# Patient Record
Sex: MALE | Race: AMERICAN INDIAN OR ALASKA NATIVE | NOT HISPANIC OR LATINO | URBAN - METROPOLITAN AREA
[De-identification: names, ages, dates, MRNs, and addresses within clinical notes are randomized per-mention and may not be internally consistent; named-entity substitution may affect disease eponyms.]

---

## 2017-07-14 ENCOUNTER — EMERGENCY (EMERGENCY)
Facility: HOSPITAL | Age: 42
LOS: 0 days | Discharge: HOME | End: 2017-07-14

## 2017-07-14 DIAGNOSIS — M79.642 PAIN IN LEFT HAND: ICD-10-CM

## 2017-07-14 DIAGNOSIS — Z79.899 OTHER LONG TERM (CURRENT) DRUG THERAPY: ICD-10-CM

## 2017-10-02 ENCOUNTER — OUTPATIENT (OUTPATIENT)
Dept: OUTPATIENT SERVICES | Facility: HOSPITAL | Age: 42
LOS: 1 days | Discharge: HOME | End: 2017-10-02

## 2017-10-02 DIAGNOSIS — R05 COUGH: ICD-10-CM

## 2017-10-02 DIAGNOSIS — Z00.00 ENCOUNTER FOR GENERAL ADULT MEDICAL EXAMINATION WITHOUT ABNORMAL FINDINGS: ICD-10-CM

## 2019-04-10 ENCOUNTER — EMERGENCY (EMERGENCY)
Facility: HOSPITAL | Age: 44
LOS: 0 days | Discharge: HOME | End: 2019-04-10
Admitting: PHYSICIAN ASSISTANT
Payer: MEDICAID

## 2019-04-10 VITALS
SYSTOLIC BLOOD PRESSURE: 143 MMHG | TEMPERATURE: 97 F | OXYGEN SATURATION: 99 % | DIASTOLIC BLOOD PRESSURE: 83 MMHG | HEART RATE: 73 BPM | RESPIRATION RATE: 18 BRPM

## 2019-04-10 DIAGNOSIS — H53.8 OTHER VISUAL DISTURBANCES: ICD-10-CM

## 2019-04-10 DIAGNOSIS — H57.89 OTHER SPECIFIED DISORDERS OF EYE AND ADNEXA: ICD-10-CM

## 2019-04-10 PROCEDURE — 99282 EMERGENCY DEPT VISIT SF MDM: CPT

## 2019-04-10 NOTE — ED PROVIDER NOTE - PHYSICAL EXAMINATION
CONST: Well appearing in NAD  EYES: PERRL, EOMI, Sclera and conjunctiva clear. Vision 20/20 bilaterally, no abrasion noted  ENT: No nasal discharge. TM's clear B/L without drainage. Oropharynx normal appearing, no erythema or exudates. No abscess or swelling. Uvula midline. No temporal artery or mastoid tenderness.  NECK: Non-tender, no meningeal signs. normal ROM. supple   CARD: Normal S1 S2; Normal rate and rhythm  RESP: Equal BS B/L, No wheezes, rhonchi or rales. No distress  SKIN: Warm, dry, no acute rashes. Good turgor  NEURO: A&Ox3, No focal deficits. Strength 5/5 with no sensory deficits. Steady gait. F

## 2019-04-10 NOTE — ED PROVIDER NOTE - OBJECTIVE STATEMENT
44 year old male with no pmhx presents with left eye blurry vision x 3 days. Pt admits to bleeding from left eye last night after rubbing it. No pain, discharge, or swelling. wears glasses. no contacts

## 2019-04-10 NOTE — CONSULT NOTE ADULT - ASSESSMENT
1) Amaurosis Fugax  - ocular and retinal health intact with dilated fundus examination  - consider cardiac work up to include carotid doppler  - Pt advised to also f/u with PCP, RTC if symptoms recur or do not get better    2) Familial Drusen OU (chronic issue)  - fundus photos, autoflurescence and mac OCT taken today  - monitor in 6 months as an outpatient

## 2019-04-10 NOTE — ED PROVIDER NOTE - NS ED ROS FT
Review of Systems:  	•	CONSTITUTIONAL - no fever, no diaphoresis, no chills  	•	SKIN - no rash  	•	HEMATOLOGIC - no bleeding, no bruising  	•	EYES - + left eye blurry vision and bleeding   	•	ENT - no change in hearing, no sore throat, no ear pain or tinnitus  	•	RESPIRATORY - no shortness of breath, no cough  	•	CARDIAC - no chest pain, no palpitations  	•	GI - no abd pain, no nausea, no vomiting, no diarrhea, no constipation  	•	GENITO-URINARY - no discharge, no dysuria; no hematuria, no increased urinary frequency  	•	MUSCULOSKELETAL - no joint paint, no swelling, no redness  	•	NEUROLOGIC - no weakness, no headache, no paresthesias, no LOC

## 2019-04-10 NOTE — ED PROVIDER NOTE - NSFOLLOWUPCLINICS_GEN_ALL_ED_FT
[facility]  Ophthalmolgy  [address], [address]  [place] [address]  Phone: [phone]  Fax:   Follow Up Time:

## 2019-04-10 NOTE — CONSULT NOTE ADULT - SUBJECTIVE AND OBJECTIVE BOX
LEONIE PIERRE  MRN-[id number]  44y Male      Patient is a 44y old  Male who presents with a chief complaint of blurry vision    HPI: Pt c/o intermittent blurry vison OS. Pt reports having three episodes of blurry vision OS lasting 2-3 minutes over the last 3 days. Pt denies associated headache, numbness, weakness, or other symptoms. Pt reports after it happened yesterday he thought there was blood coming out of the eye.  Pt denies history of trauma, recent sick contacts, cold flu or other associated symptoms.   (-)burning, itching, redness, tearing OD/OS  (-)flashes, floaters, eye pain OD/OS    PAST MEDICAL & SURGICAL HISTORY:  High cholesterol     MEDICATIONS  (STANDING):  omeprazole, ASA 81 mg     Allergies  NKDA    Intolerances  None known    POHx:  HOLLY: 1 month at [facility]  Presbyopia   (-)injuries/surgeries    Ocular Medications: none    FOHx: Non-contributory    VISUAL ACUITY  OD:  sc 20/20  OS   sc 20/20    NEURO TESTING  Pupils: 	PERRL, (-) APD OD/OS  EOMS: 	FULL OD/OS  CVF: 	Full to red light OD/OS    ANTERIOR SEGMENT EVALUATION:   lids/lashes: 	clear OD/OS  conjunctiva: 	clear OD/OS  cornea: 	clear OD/OS  anterior chamber: deep & quiet OD/OS  iris: 	flat and even OD/OS    INTRAOCULAR PRESSURE  Goldmann Applanation Tonometry  OD: 20mmHg  OS: 20 mmHg  @10:39am     POSTERIOR SEGMENT EVALUATION  Dilated: Tropicamide 1%, Phenylephrine 2.5% OD/OS  Lens: 		1+ACC/PCC OD/OS  Vitreous: 	clear OD/OS  Optic nerve:	distinct, pink and healthy OD/OS  Macula: 	mottling, dense areas of small hard drusen OD/OS  Vessels: 	2:3 OD/OS  Periphery: 	flat, (-)holes/breaks/tears 360 OD/OS

## 2019-04-16 PROBLEM — Z00.00 ENCOUNTER FOR PREVENTIVE HEALTH EXAMINATION: Status: ACTIVE | Noted: 2019-04-16

## 2019-09-21 ENCOUNTER — EMERGENCY (EMERGENCY)
Facility: HOSPITAL | Age: 44
LOS: 0 days | Discharge: HOME | End: 2019-09-21
Attending: EMERGENCY MEDICINE | Admitting: EMERGENCY MEDICINE
Payer: MEDICAID

## 2019-09-21 VITALS
OXYGEN SATURATION: 99 % | RESPIRATION RATE: 20 BRPM | SYSTOLIC BLOOD PRESSURE: 137 MMHG | HEART RATE: 61 BPM | DIASTOLIC BLOOD PRESSURE: 86 MMHG

## 2019-09-21 VITALS
HEART RATE: 100 BPM | OXYGEN SATURATION: 98 % | DIASTOLIC BLOOD PRESSURE: 89 MMHG | SYSTOLIC BLOOD PRESSURE: 149 MMHG | RESPIRATION RATE: 18 BRPM | TEMPERATURE: 98 F

## 2019-09-21 DIAGNOSIS — I25.10 ATHEROSCLEROTIC HEART DISEASE OF NATIVE CORONARY ARTERY WITHOUT ANGINA PECTORIS: ICD-10-CM

## 2019-09-21 DIAGNOSIS — R07.89 OTHER CHEST PAIN: ICD-10-CM

## 2019-09-21 DIAGNOSIS — R07.9 CHEST PAIN, UNSPECIFIED: ICD-10-CM

## 2019-09-21 LAB
ALBUMIN SERPL ELPH-MCNC: 4.3 G/DL — SIGNIFICANT CHANGE UP (ref 3.5–5.2)
ALP SERPL-CCNC: 67 U/L — SIGNIFICANT CHANGE UP (ref 30–115)
ALT FLD-CCNC: 31 U/L — SIGNIFICANT CHANGE UP (ref 0–41)
ANION GAP SERPL CALC-SCNC: 12 MMOL/L — SIGNIFICANT CHANGE UP (ref 7–14)
AST SERPL-CCNC: 26 U/L — SIGNIFICANT CHANGE UP (ref 0–41)
BILIRUB SERPL-MCNC: 0.9 MG/DL — SIGNIFICANT CHANGE UP (ref 0.2–1.2)
BUN SERPL-MCNC: 13 MG/DL — SIGNIFICANT CHANGE UP (ref 10–20)
CALCIUM SERPL-MCNC: 9.5 MG/DL — SIGNIFICANT CHANGE UP (ref 8.5–10.1)
CHLORIDE SERPL-SCNC: 104 MMOL/L — SIGNIFICANT CHANGE UP (ref 98–110)
CO2 SERPL-SCNC: 23 MMOL/L — SIGNIFICANT CHANGE UP (ref 17–32)
CREAT SERPL-MCNC: 0.9 MG/DL — SIGNIFICANT CHANGE UP (ref 0.7–1.5)
GLUCOSE SERPL-MCNC: 113 MG/DL — HIGH (ref 70–99)
HCT VFR BLD CALC: 48.3 % — SIGNIFICANT CHANGE UP (ref 42–52)
HGB BLD-MCNC: 16.2 G/DL — SIGNIFICANT CHANGE UP (ref 14–18)
MCHC RBC-ENTMCNC: 29.1 PG — SIGNIFICANT CHANGE UP (ref 27–31)
MCHC RBC-ENTMCNC: 33.5 G/DL — SIGNIFICANT CHANGE UP (ref 32–37)
MCV RBC AUTO: 86.7 FL — SIGNIFICANT CHANGE UP (ref 80–94)
NRBC # BLD: 0 /100 WBCS — SIGNIFICANT CHANGE UP (ref 0–0)
PLATELET # BLD AUTO: 285 K/UL — SIGNIFICANT CHANGE UP (ref 130–400)
POTASSIUM SERPL-MCNC: 4.2 MMOL/L — SIGNIFICANT CHANGE UP (ref 3.5–5)
POTASSIUM SERPL-SCNC: 4.2 MMOL/L — SIGNIFICANT CHANGE UP (ref 3.5–5)
PROT SERPL-MCNC: 7.8 G/DL — SIGNIFICANT CHANGE UP (ref 6–8)
RBC # BLD: 5.57 M/UL — SIGNIFICANT CHANGE UP (ref 4.7–6.1)
RBC # FLD: 12.5 % — SIGNIFICANT CHANGE UP (ref 11.5–14.5)
SODIUM SERPL-SCNC: 139 MMOL/L — SIGNIFICANT CHANGE UP (ref 135–146)
TROPONIN T SERPL-MCNC: <0.01 NG/ML — SIGNIFICANT CHANGE UP
WBC # BLD: 10.16 K/UL — SIGNIFICANT CHANGE UP (ref 4.8–10.8)
WBC # FLD AUTO: 10.16 K/UL — SIGNIFICANT CHANGE UP (ref 4.8–10.8)

## 2019-09-21 PROCEDURE — 93010 ELECTROCARDIOGRAM REPORT: CPT

## 2019-09-21 PROCEDURE — 75574 CT ANGIO HRT W/3D IMAGE: CPT | Mod: 26

## 2019-09-21 PROCEDURE — 71046 X-RAY EXAM CHEST 2 VIEWS: CPT | Mod: 26

## 2019-09-21 PROCEDURE — 99285 EMERGENCY DEPT VISIT HI MDM: CPT

## 2019-09-21 RX ORDER — FAMOTIDINE 10 MG/ML
20 INJECTION INTRAVENOUS ONCE
Refills: 0 | Status: COMPLETED | OUTPATIENT
Start: 2019-09-21 | End: 2019-09-21

## 2019-09-21 RX ORDER — METOPROLOL TARTRATE 50 MG
100 TABLET ORAL ONCE
Refills: 0 | Status: COMPLETED | OUTPATIENT
Start: 2019-09-21 | End: 2019-09-21

## 2019-09-21 RX ORDER — DIPHENHYDRAMINE HYDROCHLORIDE AND LIDOCAINE HYDROCHLORIDE AND ALUMINUM HYDROXIDE AND MAGNESIUM HYDRO
30 KIT ONCE
Refills: 0 | Status: COMPLETED | OUTPATIENT
Start: 2019-09-21 | End: 2019-09-21

## 2019-09-21 RX ADMIN — DIPHENHYDRAMINE HYDROCHLORIDE AND LIDOCAINE HYDROCHLORIDE AND ALUMINUM HYDROXIDE AND MAGNESIUM HYDRO 30 MILLILITER(S): KIT at 10:26

## 2019-09-21 RX ADMIN — FAMOTIDINE 20 MILLIGRAM(S): 10 INJECTION INTRAVENOUS at 10:25

## 2019-09-21 RX ADMIN — Medication 100 MILLIGRAM(S): at 11:29

## 2019-09-21 NOTE — ED PROVIDER NOTE - CARE PROVIDER_API CALL
Chata Chong)  Internal Medicine  [address] [place] [address], [place] [address]  Phone: [phone]  Fax: [phone]  Follow Up Time:     Ketan Mirza)  Gastroenterology  [address] [place] [address]  Phone: [phone]  Fax: [phone]  Follow Up Time:

## 2019-09-21 NOTE — ED PROVIDER NOTE - PATIENT PORTAL LINK FT
You can access the FollowMyHealth Patient Portal offered by [facility] by registering at the following website: [email or web address]. By joining [employer]’s FollowMyHealth portal, you will also be able to view your health information using other applications (apps) compatible with our system.

## 2019-09-21 NOTE — ED PROVIDER NOTE - CLINICAL SUMMARY MEDICAL DECISION MAKING FREE TEXT BOX
Labs, CXR ok. CCTA with CAD-RAD score of 0 and calcium score of 0. Pt felt better with GI cocktail, will d/c with GI f/u.

## 2019-09-21 NOTE — ED ADULT NURSE NOTE - CHPI ED NUR SYMPTOMS NEG
no shortness of breath/no back pain/no diaphoresis/no syncope/no fever/no chills/no nausea/no congestion/no dizziness/no vomiting

## 2019-09-21 NOTE — ED PROVIDER NOTE - NS ED ROS FT
Review of Systems:  •	CONSTITUTIONAL - No fever, No diaphoresis, No weight change  •	SKIN - No rash  •	HEMATOLOGIC - No abnormal bleeding or bruising  •	EYES - No eye pain, No blurred vision  •	ENT - No change in hearing, No sore throat, No neck pain, No rhinorrhea, No ear pain  •	RESPIRATORY - No shortness of breath, No cough  •	CARDIAC - + chest pain, No palpitations  •	GI - No abdominal pain, No nausea, No vomiting, No diarrhea, No constipation, No bright red blood per rectum or melena. No flank pain  •             - No dysuria, frequency, hematuria.   •	ENDO - No polydypsia, No polyuria, No heat/cold intolerance  •	MUSCULOSKELETAL - No joint paint, No swelling, No back pain  •	NEUROLOGIC - No numbness, No focal weakness, No headache, No dizziness  All other systems negative, unless specified in HPI

## 2019-09-21 NOTE — ED ADULT NURSE NOTE - OBJECTIVE STATEMENT
43 y/o male brought in for chest pain. Patient PMH gerd, gastritis, herpes complaint of midsternal chest pain radiating to left chest are for 3 days. Patient states pain feels "burning" intermittent lasting 1-2 minutes. Patient states he has been belching more often than usual. Patient noticed pain this morning around 7am after eating breakfast. Patient also admits to headache and left calf pain. 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 Patient denies: blurry vision, confusion, nausea, vomiting, shortness of breath, diarrhea, abdominal pain.

## 2019-09-21 NOTE — ED PROVIDER NOTE - OBJECTIVE STATEMENT
45 y/o male with PMH of GERD who presents to ED for chest pain. Pt states that for the past 3 day he has had burning pain in the left side of chest lasting a few seconds, no diaphoreses, radiation. No n/v. No abdominal pain. Never smoker, no family hx of heart disease.

## 2019-09-21 NOTE — ED PROVIDER NOTE - CARE PROVIDERS DIRECT ADDRESSES
[email or web address]

## 2019-09-21 NOTE — ED PROVIDER NOTE - ATTENDING CONTRIBUTION TO CARE
43yo man h/o GERD c/o L upper chest pain over the past few days, intermittent, nonexertional, worse with lying down 43yo man h/o GERD c/o L upper chest pain over the past few days, intermittent, nonexertional, worse with lying down. No exertional component, nonsmoker with no FH of CAD. VS, exam as noted, pt nontoxic appearing but uncomfortable, lungs CTA, CVS1S2 RRR abd soft, NT, ND, no peripheral edema. EKG nonischemic, doubt PE, sx more likely GI but will check labs, consider CCTA, reassess.

## 2020-08-20 ENCOUNTER — EMERGENCY (EMERGENCY)
Facility: HOSPITAL | Age: 45
LOS: 0 days | Discharge: HOME | End: 2020-08-20
Attending: STUDENT IN AN ORGANIZED HEALTH CARE EDUCATION/TRAINING PROGRAM | Admitting: FAMILY MEDICINE
Payer: COMMERCIAL

## 2020-08-20 VITALS
OXYGEN SATURATION: 100 % | DIASTOLIC BLOOD PRESSURE: 83 MMHG | TEMPERATURE: 98 F | SYSTOLIC BLOOD PRESSURE: 141 MMHG | RESPIRATION RATE: 17 BRPM | HEART RATE: 79 BPM | WEIGHT: 212.97 LBS

## 2020-08-20 DIAGNOSIS — R07.89 OTHER CHEST PAIN: ICD-10-CM

## 2020-08-20 DIAGNOSIS — R07.9 CHEST PAIN, UNSPECIFIED: ICD-10-CM

## 2020-08-20 DIAGNOSIS — K21.9 GASTRO-ESOPHAGEAL REFLUX DISEASE WITHOUT ESOPHAGITIS: ICD-10-CM

## 2020-08-20 PROBLEM — K29.70 GASTRITIS, UNSPECIFIED, WITHOUT BLEEDING: Chronic | Status: ACTIVE | Noted: 2019-09-21

## 2020-08-20 PROBLEM — B00.9 HERPESVIRAL INFECTION, UNSPECIFIED: Chronic | Status: ACTIVE | Noted: 2019-09-21

## 2020-08-20 LAB
ALBUMIN SERPL ELPH-MCNC: 4.3 G/DL — SIGNIFICANT CHANGE UP (ref 3.5–5.2)
ALP SERPL-CCNC: 74 U/L — SIGNIFICANT CHANGE UP (ref 30–115)
ALT FLD-CCNC: 30 U/L — SIGNIFICANT CHANGE UP (ref 0–41)
ANION GAP SERPL CALC-SCNC: 9 MMOL/L — SIGNIFICANT CHANGE UP (ref 7–14)
AST SERPL-CCNC: 29 U/L — SIGNIFICANT CHANGE UP (ref 0–41)
BASOPHILS # BLD AUTO: 0.09 K/UL — SIGNIFICANT CHANGE UP (ref 0–0.2)
BASOPHILS NFR BLD AUTO: 0.9 % — SIGNIFICANT CHANGE UP (ref 0–1)
BILIRUB SERPL-MCNC: 0.9 MG/DL — SIGNIFICANT CHANGE UP (ref 0.2–1.2)
BUN SERPL-MCNC: 11 MG/DL — SIGNIFICANT CHANGE UP (ref 10–20)
CALCIUM SERPL-MCNC: 9.3 MG/DL — SIGNIFICANT CHANGE UP (ref 8.5–10.1)
CHLORIDE SERPL-SCNC: 104 MMOL/L — SIGNIFICANT CHANGE UP (ref 98–110)
CO2 SERPL-SCNC: 25 MMOL/L — SIGNIFICANT CHANGE UP (ref 17–32)
CREAT SERPL-MCNC: 1 MG/DL — SIGNIFICANT CHANGE UP (ref 0.7–1.5)
EOSINOPHIL # BLD AUTO: 0.54 K/UL — SIGNIFICANT CHANGE UP (ref 0–0.7)
EOSINOPHIL NFR BLD AUTO: 5.4 % — SIGNIFICANT CHANGE UP (ref 0–8)
GLUCOSE SERPL-MCNC: 108 MG/DL — HIGH (ref 70–99)
HCT VFR BLD CALC: 50 % — SIGNIFICANT CHANGE UP (ref 42–52)
HGB BLD-MCNC: 16.3 G/DL — SIGNIFICANT CHANGE UP (ref 14–18)
IMM GRANULOCYTES NFR BLD AUTO: 0.4 % — HIGH (ref 0.1–0.3)
LIDOCAIN IGE QN: 35 U/L — SIGNIFICANT CHANGE UP (ref 7–60)
LYMPHOCYTES # BLD AUTO: 3.5 K/UL — HIGH (ref 1.2–3.4)
LYMPHOCYTES # BLD AUTO: 34.9 % — SIGNIFICANT CHANGE UP (ref 20.5–51.1)
MAGNESIUM SERPL-MCNC: 2.2 MG/DL — SIGNIFICANT CHANGE UP (ref 1.8–2.4)
MCHC RBC-ENTMCNC: 28.8 PG — SIGNIFICANT CHANGE UP (ref 27–31)
MCHC RBC-ENTMCNC: 32.6 G/DL — SIGNIFICANT CHANGE UP (ref 32–37)
MCV RBC AUTO: 88.5 FL — SIGNIFICANT CHANGE UP (ref 80–94)
MONOCYTES # BLD AUTO: 1.11 K/UL — HIGH (ref 0.1–0.6)
MONOCYTES NFR BLD AUTO: 11.1 % — HIGH (ref 1.7–9.3)
NEUTROPHILS # BLD AUTO: 4.75 K/UL — SIGNIFICANT CHANGE UP (ref 1.4–6.5)
NEUTROPHILS NFR BLD AUTO: 47.3 % — SIGNIFICANT CHANGE UP (ref 42.2–75.2)
NRBC # BLD: 0 /100 WBCS — SIGNIFICANT CHANGE UP (ref 0–0)
NT-PROBNP SERPL-SCNC: 10 PG/ML — SIGNIFICANT CHANGE UP (ref 0–300)
PLATELET # BLD AUTO: 287 K/UL — SIGNIFICANT CHANGE UP (ref 130–400)
POTASSIUM SERPL-MCNC: 4.2 MMOL/L — SIGNIFICANT CHANGE UP (ref 3.5–5)
POTASSIUM SERPL-SCNC: 4.2 MMOL/L — SIGNIFICANT CHANGE UP (ref 3.5–5)
PROT SERPL-MCNC: 7.3 G/DL — SIGNIFICANT CHANGE UP (ref 6–8)
RBC # BLD: 5.65 M/UL — SIGNIFICANT CHANGE UP (ref 4.7–6.1)
RBC # FLD: 12.6 % — SIGNIFICANT CHANGE UP (ref 11.5–14.5)
SODIUM SERPL-SCNC: 138 MMOL/L — SIGNIFICANT CHANGE UP (ref 135–146)
TROPONIN T SERPL-MCNC: <0.01 NG/ML — SIGNIFICANT CHANGE UP
WBC # BLD: 10.03 K/UL — SIGNIFICANT CHANGE UP (ref 4.8–10.8)
WBC # FLD AUTO: 10.03 K/UL — SIGNIFICANT CHANGE UP (ref 4.8–10.8)

## 2020-08-20 PROCEDURE — 93010 ELECTROCARDIOGRAM REPORT: CPT

## 2020-08-20 PROCEDURE — 71046 X-RAY EXAM CHEST 2 VIEWS: CPT | Mod: 26

## 2020-08-20 PROCEDURE — 99285 EMERGENCY DEPT VISIT HI MDM: CPT

## 2020-08-20 RX ORDER — FAMOTIDINE 10 MG/ML
20 INJECTION INTRAVENOUS ONCE
Refills: 0 | Status: COMPLETED | OUTPATIENT
Start: 2020-08-20 | End: 2020-08-20

## 2020-08-20 RX ADMIN — FAMOTIDINE 20 MILLIGRAM(S): 10 INJECTION INTRAVENOUS at 10:09

## 2020-08-20 RX ADMIN — Medication 30 MILLILITER(S): at 10:09

## 2020-08-20 NOTE — ED PROVIDER NOTE - NS ED ATTENDING STATEMENT MOD
I have personally seen and examined this patient.  I have fully participated in the care of this patient. I have reviewed all pertinent clinical information, including history, physical exam, plan and the Resident’s note and agree except as noted. I have personally seen and examined this patient.  I have fully participated in the care of this patient. I have reviewed all pertinent clinical information, including history, physical exam, plan and the Medical/PA/NP Student and Resident’s note and agree except as noted.

## 2020-08-20 NOTE — ED PROVIDER NOTE - OBJECTIVE STATEMENT
45 year old male with PMH GERD complains of a 3 week history of left sided chest pain. He describes the pain as intermittent burning or sharp pains accompanied by radiation into the left upper extremity. He says the pain gets worse when he lays down at night and Tylenol has helped relieve the pain. He had a similar complaint 1 year ago, had a CTA with CAD-RADS 0, and was told to follow up with GI and Cardio. He denies shortness of breath, dizziness, palpitations, cough, recent illness, nausea, vomiting, abdominal pain, diarrhea, constipation, swelling, rashes, or urinary symptoms. 45 year old male with PMH GERD complains of a 3 week history of left sided chest pain. Last episode of chest pain was this morning. Patient states he currently does not have chest pain in the ED. He describes the pain as intermittent burning or sharp pains accompanied by radiation into the left upper extremity. He says the pain gets worse when he lays down at night and Tylenol has helped relieve the pain. He had a similar complaint 1 year ago, had a CTA with CAD-RADS 0, and was told to follow up with GI and Cardio. He denies shortness of breath, dizziness, palpitations, cough, recent illness, nausea, vomiting, abdominal pain, diarrhea, constipation, swelling, rashes, or urinary symptoms.

## 2020-08-20 NOTE — ED PROVIDER NOTE - PROVIDER TOKENS
PROVIDER:[TOKEN:[[id number]],FOLLOWUP:[Urgent],ESTABLISHEDPATIENT:[T]],PROVIDER:[TOKEN:[[id number]],FOLLOWUP:[1-3 Days]],PROVIDER:[TOKEN:[[id number]],FOLLOWUP:[1-3 Days]]

## 2020-08-20 NOTE — ED PROVIDER NOTE - CLINICAL SUMMARY MEDICAL DECISION MAKING FREE TEXT BOX
pt presents with chest pain, asymptomatic at presentation. ekg, cxr, and labs were unremarkable. pt continued to be asymptomatic during evaluation. will dc pt with pcp follow up and return precautions'.

## 2020-08-20 NOTE — ED PROVIDER NOTE - PATIENT PORTAL LINK FT
You can access the FollowMyHealth Patient Portal offered by [facility] by registering at the following website: [email or web address]. By joining [employer]’s FollowMyHealth portal, you will also be able to view your health information using other applications (apps) compatible with our system.

## 2020-08-20 NOTE — ED PROVIDER NOTE - ATTENDING CONTRIBUTION TO CARE
45 yr old m that presents with chest pain. Pt reports that the chest pain has been going on for ~ 3 weeks on and off and is worse when pressing on the chest. Pt on exam, is currently asymptomatic. Pt at the point of exam denies any symptoms.     Review of Systems    Constitutional: (-) fever  Cardiovascular: (-) chest pain, (-) syncope  Respiratory: (-) cough, (-) shortness of breath  Gastrointestinal: (-) vomiting, (-) diarrhea, (-) abdominal pain  Musculoskeletal: (-) neck pain, (-) back pain, (-) joint pain  Integumentary: (-) rash, (-) edema  Neurological: (-) headache, (-) altered mental status    Except as documented in the HPI, all other systems are negative.    VITAL SIGNS: I have reviewed nursing notes and confirm.  CONSTITUTIONAL: non-toxic, well appearing  SKIN: no rash, no petechiae.  EYES: PERRL, EOMI, pink conjunctiva, anicteric  ENT: tongue midline, no exudates, MMM  NECK: Supple; no meningismus, no JVD  CARD: RRR, no murmurs, equal radial pulses bilaterally 2+. chest pain is reproducible on palpation. no rashes   RESP: CTAB, no respiratory distress  ABD: Soft, non-tender, non-distended, no peritoneal signs, no HSM, no CVA tenderness  EXT: Normal ROM x4. No edema. No calves tenderness  NEURO: Alert, oriented. CN2-12 intact, equal strength bilaterally, nl gait.  PSYCH: Cooperative, appropriate.    a/p  45 yr old m who presents with chest pain, currently asymptomatic and chest pain is reproducible on palpation during exam. HEART score of 2   -ekg unremarkable  -labs  -trop x 1  -cxr  -consider discharge with cardiology follow up

## 2020-08-20 NOTE — ED ADULT TRIAGE NOTE - CHIEF COMPLAINT QUOTE
patient complaint of chest pain x 3 days hx of covid in april patient complaint of chest pain x 3 week; hx of covid in April

## 2020-08-20 NOTE — ED PROVIDER NOTE - CARE PROVIDER_API CALL
eRza Reid  MEDICINE  [address] [place] [address]  Phone: [phone]  Fax: [phone]  Established Patient  Follow Up Time: Urgent    Marcelino George  GASTROENTEROLOGY  [address] [place] [address]  Phone: [phone]  Fax: [phone]  Follow Up Time: 1-3 Days    Wild Cooper)  Cardiovascular Disease; Interventional Cardiology  [address] [facility] [place] [address] [place] [address]  Phone: [phone]  Fax: [phone]  Follow Up Time: 1-3 Days

## 2020-08-20 NOTE — ED PROVIDER NOTE - PHYSICAL EXAMINATION
General: Patient is well-appearing, laying in bed comfortably  HEENT: Normocephalic, atraumatic. Pupils equal and reactive bilaterally. Mucus membranes appear moist  Neck: Supple, nontender, no JVD  Cardio: S1, S2, no murmurs, rubs, gallops. Full 2+ pulses, equal in all four extremities  Chest: Tenderness to palpation on the left sternal border, at the level of the 4th and 5th ribs.   Pulm: Clear to auscultation bilaterally, no wheezes, rhonchi, rales  GI: Tender to palpation in the epigastrium. No other areas of tenderness, no masses noted  Skin: No rashes or lesions  Neuro: AAOx3, no focal neurological deficits  Psych: Appropriate to situation

## 2020-08-20 NOTE — ED PROVIDER NOTE - CARE PLAN
Principal Discharge DX:	Chest wall pain  Secondary Diagnosis:	GERD (gastroesophageal reflux disease)

## 2020-08-20 NOTE — ED PROVIDER NOTE - NSFOLLOWUPINSTRUCTIONS_ED_ALL_ED_FT
Chest Wall Pain    Chest wall pain is pain in or around the bones and muscles of your chest. Sometimes, an injury causes this pain. Sometimes, the cause may not be known. This pain may take several weeks or longer to get better.     HOME CARE  Pay attention to any changes in your symptoms. Take these actions to help with your pain:    Rest as told by your doctor.  Avoid activities that cause pain. Try not to use your chest, belly (abdominal), or side muscles to lift heavy things.  If directed, apply ice to the painful area:  Put ice in a plastic bag.  Place a towel between your skin and the bag.  Leave the ice on for 20 minutes, 2–3 times per day.  Take over-the-counter and prescription medicines only as told by your doctor.  Do not use tobacco products, including cigarettes, chewing tobacco, and e-cigarettes. If you need help quitting, ask your doctor.  Keep all follow-up visits as told by your doctor. This is important.    GET HELP IF:  You have a fever.  Your chest pain gets worse.  You have new symptoms.    GET HELP RIGHT AWAY IF:  You feel sick to your stomach (nauseous) or you throw up (vomit).  You feel sweaty or light-headed.  You have a cough with phlegm (sputum) or you cough up blood.  You are short of breath.    ADDITIONAL NOTES AND INSTRUCTIONS    Please follow up with your Primary MD in 24-48 hr.  Seek immediate medical care for any new/worsening signs or symptoms.     Gastroesophageal Reflux Disease, Adult    Normally, food travels down the esophagus and stays in the stomach to be digested. However, when a person has gastroesophageal reflux disease (GERD), food and stomach acid move back up into the esophagus. When this happens, the esophagus becomes sore and inflamed. Over time, GERD can create small holes (ulcers) in the lining of the esophagus.    What are the causes?  This condition is caused by a problem with the muscle between the esophagus and the stomach (lower esophageal sphincter, or LES). Normally, the LES muscle closes after food passes through the esophagus to the stomach. When the LES is weakened or abnormal, it does not close properly, and that allows food and stomach acid to go back up into the esophagus. The LES can be weakened by certain dietary substances, medicines, and medical conditions, including:  Tobacco use.  Pregnancy.  Having a hiatal hernia.  Heavy alcohol use.  Certain foods and beverages, such as coffee, chocolate, onions, and peppermint.  What increases the risk?  This condition is more likely to develop in:  People who have an increased body weight.  People who have connective tissue disorders.  People who use NSAID medicines.  What are the signs or symptoms?  Symptoms of this condition include:  Heartburn.  Difficult or painful swallowing.  The feeling of having a lump in the throat.  A bitter taste in the mouth.  Bad breath.  Having a large amount of saliva.  Having an upset or bloated stomach.  Belching.  Chest pain.  Shortness of breath or wheezing.  Ongoing (chronic) cough or a night-time cough.  Wearing away of tooth enamel.  Weight loss.  Different conditions can cause chest pain. Make sure to see your health care provider if you experience chest pain.    How is this diagnosed?  Your health care provider will take a medical history and perform a physical exam. To determine if you have mild or severe GERD, your health care provider may also monitor how you respond to treatment. You may also have other tests, including:  An endoscopy to examine your stomach and esophagus with a small camera.  A test that measures the acidity level in your esophagus.  A test that measures how much pressure is on your esophagus.  A barium swallow or modified barium swallow to show the shape, size, and functioning of your esophagus.  How is this treated?  The goal of treatment is to help relieve your symptoms and to prevent complications. Treatment for this condition may vary depending on how severe your symptoms are. Your health care provider may recommend:  Changes to your diet.  Medicine.  Surgery.  Follow these instructions at home:  Diet     Follow a diet as recommended by your health care provider. This may involve avoiding foods and drinks such as:  Coffee and tea (with or without caffeine).  Drinks that contain alcohol.  Energy drinks and sports drinks.  Carbonated drinks or sodas.  Chocolate and cocoa.  Peppermint and mint flavorings.  Garlic and onions.  Horseradish.  Spicy and acidic foods, including peppers, chili powder, reyes powder, vinegar, hot sauces, and barbecue sauce.  Citrus fruit juices and citrus fruits, such as oranges, leila, and limes.  Tomato-based foods, such as red sauce, chili, salsa, and pizza with red sauce.  Fried and fatty foods, such as donuts, french fries, potato chips, and high-fat dressings.  High-fat meats, such as hot dogs and fatty cuts of red and white meats, such as rib eye steak, sausage, ham, and mckeon.  High-fat dairy items, such as whole milk, butter, and cream cheese.  Eat small, frequent meals instead of large meals.  Avoid drinking large amounts of liquid with your meals.  Avoid eating meals during the 2–3 hours before bedtime.  Avoid lying down right after you eat.  Do not exercise right after you eat.  General instructions     Pay attention to any changes in your symptoms.  Take over-the-counter and prescription medicines only as told by your health care provider. Do not take aspirin, ibuprofen, or other NSAIDs unless your health care provider told you to do so.  Do not use any tobacco products, including cigarettes, chewing tobacco, and e-cigarettes. If you need help quitting, ask your health care provider.  Wear loose-fitting clothing. Do not wear anything tight around your waist that causes pressure on your abdomen.  Raise (elevate) the head of your bed 6 inches (15cm).  Try to reduce your stress, such as with yoga or meditation. If you need help reducing stress, ask your health care provider.  If you are overweight, reduce your weight to an amount that is healthy for you. Ask your health care provider for guidance about a safe weight loss goal.  Keep all follow-up visits as told by your health care provider. This is important.  Contact a health care provider if:  You have new symptoms.  You have unexplained weight loss.  You have difficulty swallowing, or it hurts to swallow.  You have wheezing or a persistent cough.  Your symptoms do not improve with treatment.  You have a hoarse voice.  Get help right away if:  You have pain in your arms, neck, jaw, teeth, or back.  You feel sweaty, dizzy, or light-headed.  You have chest pain or shortness of breath.  You vomit and your vomit looks like blood or coffee grounds.  You faint.  Your stool is bloody or black.  You cannot swallow, drink, or eat.  This information is not intended to replace advice given to you by your health care provider. Make sure you discuss any questions you have with your health care provider.

## 2020-08-20 NOTE — ED PROVIDER NOTE - CARE PROVIDERS DIRECT ADDRESSES
[email or web address]

## 2020-08-20 NOTE — ED PROVIDER NOTE - PROGRESS NOTE DETAILS
Xavier: pt continues to be asymptomatic. Labs unremarkable will dc pt with pcp/cardiology follow up and return precautions. TA: Pt is still chest pain free. Labs/imaging reviewed; PMD, cardio, GI follow up with return precautions.

## 2020-09-16 ENCOUNTER — APPOINTMENT (OUTPATIENT)
Dept: GASTROENTEROLOGY | Facility: CLINIC | Age: 45
End: 2020-09-16
Payer: MEDICAID

## 2020-09-16 DIAGNOSIS — Z78.9 OTHER SPECIFIED HEALTH STATUS: ICD-10-CM

## 2020-09-16 DIAGNOSIS — R14.3 FLATULENCE: ICD-10-CM

## 2020-09-16 PROCEDURE — 99203 OFFICE O/P NEW LOW 30 MIN: CPT

## 2020-09-16 NOTE — PHYSICAL EXAM
[General Appearance - In No Acute Distress] : in no acute distress [General Appearance - Alert] : alert [Sclera] : the sclera and conjunctiva were normal [Outer Ear] : the ears and nose were normal in appearance [Neck Appearance] : the appearance of the neck was normal [] : no respiratory distress [Heart Rate And Rhythm] : heart rate was normal and rhythm regular [Exaggerated Use Of Accessory Muscles For Inspiration] : no accessory muscle use [Heart Sounds] : normal S1 and S2 [Abdomen Soft] : soft [Abdomen Tenderness] : non-tender [Abnormal Walk] : normal gait [Oriented To Time, Place, And Person] : oriented to person, place, and time [No Focal Deficits] : no focal deficits [Affect] : the affect was normal [Mood] : the mood was normal

## 2020-09-16 NOTE — HISTORY OF PRESENT ILLNESS
[de-identified] : Patient is a 44 y/o whom presented to the ER for Chest pain. Cardiac cause ruled out . Patient started on omeprazole with some relief but notes certain foods irritate him. Patient notes no change in bowel habits or weight.

## 2020-09-16 NOTE — ASSESSMENT
[FreeTextEntry1] : Patient is a 46 y/o whom presented to the ER for Chest pain. Cardiac cause ruled out . Patient started on omeprazole with some relief but notes certain foods irritate him. Patient notes no change in bowel habits or weight. \par \par abdominal Pain\par - Plan EGD and Colonoscopy

## 2020-12-09 RX ORDER — POLYETHYLENE GLYCOL 3350 AND ELECTROLYTES WITH LEMON FLAVOR 236; 22.74; 6.74; 5.86; 2.97 G/4L; G/4L; G/4L; G/4L; G/4L
236 POWDER, FOR SOLUTION ORAL
Qty: 1 | Refills: 0 | Status: ACTIVE | COMMUNITY
Start: 2020-12-09 | End: 1900-01-01

## 2021-02-22 ENCOUNTER — LABORATORY RESULT (OUTPATIENT)
Age: 46
End: 2021-02-22

## 2021-02-22 ENCOUNTER — OUTPATIENT (OUTPATIENT)
Dept: OUTPATIENT SERVICES | Facility: HOSPITAL | Age: 46
LOS: 1 days | Discharge: HOME | End: 2021-02-22

## 2021-02-22 DIAGNOSIS — Z11.59 ENCOUNTER FOR SCREENING FOR OTHER VIRAL DISEASES: ICD-10-CM

## 2021-02-25 ENCOUNTER — OUTPATIENT (OUTPATIENT)
Dept: OUTPATIENT SERVICES | Facility: HOSPITAL | Age: 46
LOS: 1 days | Discharge: HOME | End: 2021-02-25
Payer: COMMERCIAL

## 2021-02-25 ENCOUNTER — TRANSCRIPTION ENCOUNTER (OUTPATIENT)
Age: 46
End: 2021-02-25

## 2021-02-25 ENCOUNTER — RESULT REVIEW (OUTPATIENT)
Age: 46
End: 2021-02-25

## 2021-02-25 VITALS
HEIGHT: 78 IN | WEIGHT: 229.94 LBS | TEMPERATURE: 98 F | DIASTOLIC BLOOD PRESSURE: 93 MMHG | HEART RATE: 91 BPM | RESPIRATION RATE: 18 BRPM | SYSTOLIC BLOOD PRESSURE: 146 MMHG

## 2021-02-25 VITALS
SYSTOLIC BLOOD PRESSURE: 122 MMHG | DIASTOLIC BLOOD PRESSURE: 77 MMHG | HEART RATE: 70 BPM | OXYGEN SATURATION: 98 % | RESPIRATION RATE: 18 BRPM

## 2021-02-25 DIAGNOSIS — Z98.890 OTHER SPECIFIED POSTPROCEDURAL STATES: Chronic | ICD-10-CM

## 2021-02-25 DIAGNOSIS — Z90.49 ACQUIRED ABSENCE OF OTHER SPECIFIED PARTS OF DIGESTIVE TRACT: Chronic | ICD-10-CM

## 2021-02-25 PROCEDURE — 43239 EGD BIOPSY SINGLE/MULTIPLE: CPT | Mod: XU

## 2021-02-25 PROCEDURE — 88305 TISSUE EXAM BY PATHOLOGIST: CPT | Mod: 26

## 2021-02-25 PROCEDURE — 45380 COLONOSCOPY AND BIOPSY: CPT

## 2021-02-25 PROCEDURE — 88312 SPECIAL STAINS GROUP 1: CPT | Mod: 26

## 2021-02-25 RX ORDER — OMEPRAZOLE 10 MG/1
1 CAPSULE, DELAYED RELEASE ORAL
Qty: 0 | Refills: 0 | DISCHARGE

## 2021-02-25 NOTE — ASU PATIENT PROFILE, ADULT - PAIN CHRONIC, PROFILE
Patient seen and examined.   Labs reviewed.   PEG tube site CDI.   Dysphagia; PEG tube completed without complication.   Continue tube feeds and advance to goal rate.    no

## 2021-03-02 LAB — SURGICAL PATHOLOGY STUDY: SIGNIFICANT CHANGE UP

## 2021-03-04 DIAGNOSIS — K64.4 RESIDUAL HEMORRHOIDAL SKIN TAGS: ICD-10-CM

## 2021-03-04 DIAGNOSIS — K29.80 DUODENITIS WITHOUT BLEEDING: ICD-10-CM

## 2021-03-04 DIAGNOSIS — K29.50 UNSPECIFIED CHRONIC GASTRITIS WITHOUT BLEEDING: ICD-10-CM

## 2021-03-04 DIAGNOSIS — Z87.891 PERSONAL HISTORY OF NICOTINE DEPENDENCE: ICD-10-CM

## 2021-03-04 DIAGNOSIS — K64.8 OTHER HEMORRHOIDS: ICD-10-CM

## 2021-03-04 DIAGNOSIS — Z12.11 ENCOUNTER FOR SCREENING FOR MALIGNANT NEOPLASM OF COLON: ICD-10-CM

## 2021-03-04 DIAGNOSIS — K20.90 ESOPHAGITIS, UNSPECIFIED WITHOUT BLEEDING: ICD-10-CM

## 2021-03-12 ENCOUNTER — APPOINTMENT (OUTPATIENT)
Dept: GASTROENTEROLOGY | Facility: CLINIC | Age: 46
End: 2021-03-12
Payer: COMMERCIAL

## 2021-03-12 ENCOUNTER — TRANSCRIPTION ENCOUNTER (OUTPATIENT)
Age: 46
End: 2021-03-12

## 2021-03-12 DIAGNOSIS — R10.9 UNSPECIFIED ABDOMINAL PAIN: ICD-10-CM

## 2021-03-12 DIAGNOSIS — K21.9 GASTRO-ESOPHAGEAL REFLUX DISEASE W/OUT ESOPHAGITIS: ICD-10-CM

## 2021-03-12 PROCEDURE — 99443: CPT

## 2021-03-12 RX ORDER — PANTOPRAZOLE 40 MG/1
40 TABLET, DELAYED RELEASE ORAL
Qty: 30 | Refills: 0 | Status: ACTIVE | COMMUNITY
Start: 2021-03-12 | End: 1900-01-01

## 2021-03-12 NOTE — ASSESSMENT
[FreeTextEntry1] : Patient is a 45 y/o whom presented for follow up s/p EGD and Colonoscopy. Patient feels well. On EGd some inflammation but no IM or H Pylori. Colonoscopy was without polyps and good prep quality. PPI for one month. Follow up as needed.\par \par EGD and Colonoscopy discussed with patient\par PPI for one month \par Follow up as needed

## 2021-03-12 NOTE — HISTORY OF PRESENT ILLNESS
[Home] : at home, [unfilled] , at the time of the visit. [Medical Office: ([facility])___] : at the medical office located in  [Verbal consent obtained from patient] : the patient, [unfilled] [FreeTextEntry4] : Chata Chaidez [de-identified] : Patient is a 45 y/o whom presented for follow up s/p EGD and Colonoscopy. Patient feels well. On EGd some inflammation but no IM or H Pylori. Colonoscopy was without polyps and good prep quality.

## 2021-08-14 ENCOUNTER — TRANSCRIPTION ENCOUNTER (OUTPATIENT)
Age: 46
End: 2021-08-14

## 2022-07-15 NOTE — H&P ADULT - HISTORY OF PRESENT ILLNESS
Patient was hospitalized in [facility] for SOB for 4 days for HF, Patient DOES NOT want to see Dr. Easton going forward. When he gets back to town he will call the clinic to make further arrangements. He did not take his cardioMEMS pillow with him  
Patient presents for EGD and Colonoscopy.

## 2023-05-26 NOTE — ED PROVIDER NOTE - CCCP TRG CHIEF CMPLNT
Health Maintenance Due   Topic Date Due   • COVID-19 Vaccine (3 - Booster for Pediatric Pfizer series) 10/26/2022   • Annual Physical (ages 3-18)  07/14/2023       Patient is up to date, no discussion needed.     chest pain

## 2023-06-29 ENCOUNTER — NON-APPOINTMENT (OUTPATIENT)
Age: 48
End: 2023-06-29

## 2023-12-02 ENCOUNTER — NON-APPOINTMENT (OUTPATIENT)
Age: 48
End: 2023-12-02

## 2025-01-25 NOTE — ED ADULT NURSE NOTE - NSFALLRSKINDICATORS_ED_ALL_ED
Patient : Matt Perez Age: 67 year old Sex: male   MRN: [id number] Encounter Date: 1/24/2025    History     Chief Complaint   Patient presents with    Foot Pain       HPI    Matt Perez is a 67 year old presenting to the emergency department with complaints of bilateral foot pain.  A  was used throughout the exam.  Patient reports of burning sensation to his feet.  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  Has been occurring over the last 3 days.  Reports that he is a diabetic.    I have reviewed Matt Perez's previous office visit note from 5/29/24 .  Note Review Summary:   ASSESSMENT AND PLAN:  This 66 year old male presents with :  1. Type 2 diabetes mellitus without complication, without long-term current use of insulin  (CMD)    2. Primary hypertension              Orders Placed This Encounter    Glycohemoglobin    Comprehensive Metabolic Panel    CBC with Automated Differential    Lipid Panel With Reflex    Microalbumin Urine Random      PLAN:  Type 2 diabetes mellitus without complication, without long-term current use of insulin (CMD)  Stable.  Reviewed home blood sugar readings.  Ordered Glycohemoglobin; Future.  Ordered Comprehensive Metabolic Panel; Future.  Ordered CBC with Automated Differential; Future  Ordered Lipid Panel With Reflex; Future.  Ordered Microalbumin Urine Random; Future.  Continue current management.  Advised to avoid the sugar intake, when he gets the permanent dental procedure.     Past/Family/Social History     No Known Allergies    No current facility-administered medications for this encounter.     Current Outpatient Medications   Medication Sig    gabapentin (NEURONTIN) 300 MG capsule Take 1 capsule by mouth 3 times daily.    rosuvastatin (CRESTOR) 5 MG tablet TAKE 1 TABLET BY MOUTH EVERY DAY    glipiZIDE (GLUCOTROL XL) 10 MG 24 hr tablet TAKE 1 TABLET BY MOUTH EVERY DAY     metFORMIN (GLUCOPHAGE-XR) 500 MG 24 hr tablet Take 2 tablets by mouth daily (with breakfast).    blood glucose meter Test blood sugar 1 times daily. Diagnosis: E11.9. Meter: One Touch Verio    enalapril (VASOTEC) 10 MG tablet Take 1 tablet by mouth daily.    blood glucose lancets Test blood sugars 1 times daily. Diagnosis: E11.9. Preference: Insurance preferred.    blood glucose test strip Test blood sugars 1 times daily. Diagnosis: E11.9 Preference: Insurance preferred       Past Medical History:   Diagnosis Date    DM2 (diabetes mellitus, type 2)  (CMD)     HTN (hypertension)        Past Surgical History:   Procedure Laterality Date    Shoulder surgery Right     Tibia fracture surgery         Family History   Problem Relation Age of Onset    Diabetes Father        Social History     Tobacco Use    Smoking status: Never    Smokeless tobacco: Never   Vaping Use    Vaping status: never used   Substance Use Topics    Alcohol use: No    Drug use: No          Review of Systems   Review of Symptoms     Review of Systems   HENT:  Negative for congestion and facial swelling.    Respiratory:  Negative for chest tightness and shortness of breath.    Cardiovascular:  Negative for chest pain.   Gastrointestinal:  Negative for abdominal distention and abdominal pain.   Genitourinary:  Negative for difficulty urinating and dysuria.   Musculoskeletal:  Negative for back pain.   Skin:  Negative for rash.   Neurological:  Positive for numbness. Negative for seizures.   Psychiatric/Behavioral: Negative.            Physical Exam   Physical Exam     ED Triage Vitals [01/24/25 2221]   ED Triage Vitals Group      Temp 97.8 °F (36.6 °C)      Heart Rate 61      Resp 16      BP (!) 184/88      SpO2 98 %      EtCO2 mmHg       Height 5' 7\" (1.702 m)      Weight 179 lb (81.2 kg)      Weight Scale Used Standing scale      BMI (Calculated) 28.04      IBW/kg (Calculated) 66.1       Physical Exam  Constitutional:       Appearance: Normal  appearance.   HENT:      Head: Atraumatic.   Eyes:      Pupils: Pupils are equal, round, and reactive to light.   Cardiovascular:      Pulses:           Dorsalis pedis pulses are 2+ on the right side and 2+ on the left side.   Pulmonary:      Effort: Pulmonary effort is normal.   Musculoskeletal:         General: Normal range of motion.      Cervical back: Normal range of motion.   Skin:     General: Skin is warm and dry.      Comments: Bilateral feet with no erythema, fluctuance or lesions noted.  Capillary refill less than 2 seconds   Neurological:      Mental Status: He is oriented to person, place, and time.   Psychiatric:         Mood and Affect: Mood normal.            Procedures   ED Procedures     Procedures     Lab Results   ED Lab     No results found for this visit on 01/24/25.       EKG     No EKG performed    Radiology Results    ED Radiology Results     Imaging Results    None             ED Medications   ED Medications     Medications - No data to display       ED Course     Vitals:    01/24/25 2221   BP: (!) 184/88   BP Location: RUE - Right upper extremity   Patient Position: Sitting/High-Mreedith's   Pulse: 61   Resp: 16   Temp: 97.8 °F (36.6 °C)   TempSrc: Oral   SpO2: 98%   Weight: 81.2 kg (179 lb)   Height: 5' 7\" (1.702 m)            No cardiac monitor or imaging reviewed        Consults                    Medical Decision Making                           Patient is a 67 year old with complaint of feet pain.  My differential diagnosis includes but is not limited to plantar fasciitis versus neuropathy versus vasculitis. The patient will not require admission.  Symptoms are consistent with neuropathy.  Good dorsal pulses making vascular insufficiency unlikely.  There is no injury so no need for imaging at this time.  Will start patient on gabapentin and encouraged him to follow-up with his PCP.  May return to ED with any worsening symptoms.        Does the Patient have sepsis: NO     Critical Care        No Critical Care        Disposition       Clinical Impression and Diagnosis  11:03 PM       ED Diagnosis       Diagnosis Comment Associated Orders       Final diagnosis    Neuropathy -- --            Follow Up:  Rachel Ayala MD  [address] [address]  [phone]    Schedule an appointment as soon as possible for a visit in 2 days            Summary of your Discharge Medications        Take these Medications        Details   gabapentin 300 MG capsule  Commonly known as: NEURONTIN   Take 1 capsule by mouth 3 times daily.              Pt is discharged to home/self care in stable condition.              Discharge 1/24/2025 10:52 PM  Matt Perez discharge to home/self care.                 Carlos Tovar MD  01/24/25 3925     no

## 2025-05-14 VITALS
OXYGEN SATURATION: 99 % | RESPIRATION RATE: 17 BRPM | DIASTOLIC BLOOD PRESSURE: 76 MMHG | HEART RATE: 80 BPM | SYSTOLIC BLOOD PRESSURE: 157 MMHG

## 2025-05-14 NOTE — H&P CARDIOLOGY - HISTORY OF PRESENT ILLNESS
Patient is a 50y Male PMH of GERD.   Pt reports   Patient presents today for [facility] with possible intervention.     Pre cath note:  indication:  [ ] STEMI                [ ] NSTEMI                 [ ] Acute coronary syndrome                   [ ]Unstable Angina   [ ] high risk  [ ] intermediate risk  [ ] low risk                   [ ] Stable Angina     non-invasive testing:                          Date:                     result: [ ] high risk  [ ] intermediate risk  [ ] low risk    Anti- Anginal medications:                    [ ] not used d/t                     [ ] used   ( ) BB     ( ) CCB      ( ) Nitrate   (  ) Ranexa          [ ] not used but strong indication not to use    Ejection Fraction                   [ ] <29            [ ] 30-39%   [ ] 40-49%     [ ]>50%    CHF          [ ] active (within last 14 days on meds   [ ] Chronic (on meds but no exacerbation)  NYHA Functional Class:  (  ) Class I (no limitations)  (  ) Class II (slight limitation)  (  ) Class III (marked limitation)  (  ) Class IV (symptoms at rest)    COPD                   [ ] mild (on chronic bronchodilators)  [ ] moderate (on chronic steroid therapy)      [ ] severe (indication for home O2 or PACO2 >50)    Other risk factors:                     [ ] Previous MI                     [ ] CVA/ stroke                    [ ] carotid stent/ CEA                    [ ] PVD/PAD- (arterial aneurysm, non-palpable pulses, tortuous vessel with inability to insert catheter, infra-renal dissection, renal or subclavian artery stenosis)                    [ ] previous CABG                    [ ] Renal Failure:  on HD  (  ) yes  (  ) no                    [ ] Diabetic  (  ) Type 1  (  ) Type 2                                         (  ) Insulin dependent  (  ) non-insulin dependent                                         (  ) Metformin  (  ) Januvia  (  ) Glimepiride  (  ) Glipizide  (  ) Glyburide  (  ) Actos                                         (  ) GLP-1 receptor agonists (Ozempic, Mounjaro, Wegovy, trulicity, Byetta, Victoza)                                         (  ) SGLT2 Inhibitors (Farxiga, Jardiance, Invokana)                                         (  ) Other                Bleeding Risk:     Pre-cath Hydration: (  )  cc IV bolus x 1 over 1 hr followed by:  (  ) NS @ 75cc/hr until procedure (up to 2 hrs) if EF> 50%                                                                                                                         (  ) NS @ 50cc/hr until procedure (up to 2 hrs) if EF< 50%                                      (  ) No precath hydration d/t    RIGHT RADIAL ARTERY EVALUATION:  REDDY TEST: [] Negative          [] Positive    EF:   Date:    EKG:   Date: Patient is a 50y Male PMH of GERD, + family h/o CAD (mother). Patient presented to cardiologist with c/o CP with radiation to left arm.    Patient presents today for [facility] with possible intervention.     Pre cath note:  indication:  [ ] STEMI                [ ] NSTEMI                 [ ] Acute coronary syndrome                   [ ]Unstable Angina   [ ] high risk  [ ] intermediate risk  [ ] low risk                   [ ] Stable Angina     non-invasive testing:                          Date:                     result: [ ] high risk  [ ] intermediate risk  [ ] low risk    Anti- Anginal medications:                    [ ] not used d/t                     [ ] used   ( ) BB     ( ) CCB      ( ) Nitrate   (  ) Ranexa          [ ] not used but strong indication not to use    Ejection Fraction                   [ ] <29            [ ] 30-39%   [ ] 40-49%     [ ]>50%    CHF          [ ] active (within last 14 days on meds   [ ] Chronic (on meds but no exacerbation)  NYHA Functional Class:  (  ) Class I (no limitations)  (  ) Class II (slight limitation)  (  ) Class III (marked limitation)  (  ) Class IV (symptoms at rest)    COPD                   [ ] mild (on chronic bronchodilators)  [ ] moderate (on chronic steroid therapy)      [ ] severe (indication for home O2 or PACO2 >50)    Other risk factors:                     [ ] Previous MI                     [ ] CVA/ stroke                    [ ] carotid stent/ CEA                    [ ] PVD/PAD- (arterial aneurysm, non-palpable pulses, tortuous vessel with inability to insert catheter, infra-renal dissection, renal or subclavian artery stenosis)                    [ ] previous CABG                    [ ] Renal Failure:  on HD  (  ) yes  (  ) no                    [ ] Diabetic  (  ) Type 1  (  ) Type 2                                         (  ) Insulin dependent  (  ) non-insulin dependent                                         (  ) Metformin  (  ) Januvia  (  ) Glimepiride  (  ) Glipizide  (  ) Glyburide  (  ) Actos                                         (  ) GLP-1 receptor agonists (Ozempic, Mounjaro, Wegovy, trulicity, Byetta, Victoza)                                         (  ) SGLT2 Inhibitors (Farxiga, Jardiance, Invokana)                                         (  ) Other                Bleeding Risk: 0.7%    Pre-cath Hydration: (  )  cc IV bolus x 1 over 1 hr followed by:  (  ) NS @ 75cc/hr until procedure (up to 2 hrs) if EF> 50%                                                                                                                         (  ) NS @ 50cc/hr until procedure (up to 2 hrs) if EF< 50%                                      (  ) No precath hydration d/t    RIGHT RADIAL ARTERY EVALUATION:  REDDY TEST: [] Negative          [] Positive    EF:   Date:    EKG:   Date: Patient is a 50y Male PMH of GERD, HLD, + family h/o CAD (mother). Patient presented to cardiologist with c/o CP with radiation to left arm.  An exercise stress test was performed in the office as per patient, the patient exercised for 6 min and then developed CP at peak exercise. Patient presents today for [facility] with possible intervention.     Pre cath note:  indication:  [ ] STEMI                [ ] NSTEMI                 [ ] Acute coronary syndrome                   [ ]Unstable Angina   [ ] high risk  [ ] intermediate risk  [ ] low risk                   [ ] Stable Angina     non-invasive testing:   est    Date:      2025               result: [ ] high risk  [x ] intermediate risk  [ ] low risk    Anti- Anginal medications:                    [ ] not used d/t                     [x ] used   ( ) BB     (x ) CCB      ( ) Nitrate   (  x) Ranexa          [ ] not used but strong indication not to use    Ejection Fraction                   [ ] <29            [ ] 30-39%   [ ] 40-49%     [ ]>50%    CHF          [ ] active (within last 14 days on meds   [ ] Chronic (on meds but no exacerbation)  NYHA Functional Class:  (  ) Class I (no limitations)  (  ) Class II (slight limitation)  (  ) Class III (marked limitation)  (  ) Class IV (symptoms at rest)    COPD                   [ ] mild (on chronic bronchodilators)  [ ] moderate (on chronic steroid therapy)      [ ] severe (indication for home O2 or PACO2 >50)    Other risk factors:                     [ ] Previous MI                     [ ] CVA/ stroke                    [ ] carotid stent/ CEA                    [ ] PVD/PAD- (arterial aneurysm, non-palpable pulses, tortuous vessel with inability to insert catheter, infra-renal dissection, renal or subclavian artery stenosis)                    [ ] previous CABG                    [ ] Renal Failure:  on HD  (  ) yes  (  ) no                    [ ] Diabetic  (  ) Type 1  (  ) Type 2                                         (  ) Insulin dependent  (  ) non-insulin dependent                                         (  ) Metformin  (  ) Januvia  (  ) Glimepiride  (  ) Glipizide  (  ) Glyburide  (  ) Actos                                         (  ) GLP-1 receptor agonists (Ozempic, Mounjaro, Wegovy, trulicity, Byetta, Victoza)                                         (  ) SGLT2 Inhibitors (Farxiga, Jardiance, Invokana)                                         (  ) Other                Bleeding Risk: 0.7%    Pre-cath Hydration: ( x )  cc IV bolus x 1 over 1 hr followed by:  (x  ) NS @ 75cc/hr until procedure (up to 2 hrs) if EF> 50%                                                                                                                         (  ) NS @ 50cc/hr until procedure (up to 2 hrs) if EF< 50%                                      (  ) No precath hydration d/t    RIGHT RADIAL ARTERY EVALUATION:  REDDY TEST: [] Negative          [x] Positive    EF:   Date:    EKG: SR 72bpm  Date:

## 2025-05-16 ENCOUNTER — OUTPATIENT (OUTPATIENT)
Dept: OUTPATIENT SERVICES | Facility: HOSPITAL | Age: 50
LOS: 1 days | Discharge: HOME | End: 2025-05-16
Payer: COMMERCIAL

## 2025-05-16 ENCOUNTER — TRANSCRIPTION ENCOUNTER (OUTPATIENT)
Age: 50
End: 2025-05-16

## 2025-05-16 VITALS
RESPIRATION RATE: 18 BRPM | HEART RATE: 66 BPM | OXYGEN SATURATION: 100 % | SYSTOLIC BLOOD PRESSURE: 126 MMHG | DIASTOLIC BLOOD PRESSURE: 80 MMHG

## 2025-05-16 DIAGNOSIS — Z98.890 OTHER SPECIFIED POSTPROCEDURAL STATES: Chronic | ICD-10-CM

## 2025-05-16 DIAGNOSIS — Z90.49 ACQUIRED ABSENCE OF OTHER SPECIFIED PARTS OF DIGESTIVE TRACT: Chronic | ICD-10-CM

## 2025-05-16 DIAGNOSIS — R07.9 CHEST PAIN, UNSPECIFIED: ICD-10-CM

## 2025-05-16 LAB
ANION GAP SERPL CALC-SCNC: 10 MMOL/L — SIGNIFICANT CHANGE UP (ref 7–14)
BUN SERPL-MCNC: 17 MG/DL — SIGNIFICANT CHANGE UP (ref 10–20)
CALCIUM SERPL-MCNC: 9.3 MG/DL — SIGNIFICANT CHANGE UP (ref 8.4–10.5)
CHLORIDE SERPL-SCNC: 104 MMOL/L — SIGNIFICANT CHANGE UP (ref 98–110)
CO2 SERPL-SCNC: 26 MMOL/L — SIGNIFICANT CHANGE UP (ref 17–32)
CREAT SERPL-MCNC: 1 MG/DL — SIGNIFICANT CHANGE UP (ref 0.7–1.5)
EGFR: 91 ML/MIN/1.73M2 — SIGNIFICANT CHANGE UP
EGFR: 91 ML/MIN/1.73M2 — SIGNIFICANT CHANGE UP
GLUCOSE SERPL-MCNC: 94 MG/DL — SIGNIFICANT CHANGE UP (ref 70–99)
HCT VFR BLD CALC: 49.2 % — SIGNIFICANT CHANGE UP (ref 42–52)
HGB BLD-MCNC: 16.5 G/DL — SIGNIFICANT CHANGE UP (ref 14–18)
MCHC RBC-ENTMCNC: 29.8 PG — SIGNIFICANT CHANGE UP (ref 27–31)
MCHC RBC-ENTMCNC: 33.5 G/DL — SIGNIFICANT CHANGE UP (ref 32–37)
MCV RBC AUTO: 89 FL — SIGNIFICANT CHANGE UP (ref 80–94)
NRBC BLD AUTO-RTO: 0 /100 WBCS — SIGNIFICANT CHANGE UP (ref 0–0)
PLATELET # BLD AUTO: 304 K/UL — SIGNIFICANT CHANGE UP (ref 130–400)
PMV BLD: 9.2 FL — SIGNIFICANT CHANGE UP (ref 7.4–10.4)
POTASSIUM SERPL-MCNC: 4 MMOL/L — SIGNIFICANT CHANGE UP (ref 3.5–5)
POTASSIUM SERPL-SCNC: 4 MMOL/L — SIGNIFICANT CHANGE UP (ref 3.5–5)
RBC # BLD: 5.53 M/UL — SIGNIFICANT CHANGE UP (ref 4.7–6.1)
RBC # FLD: 13 % — SIGNIFICANT CHANGE UP (ref 11.5–14.5)
SODIUM SERPL-SCNC: 140 MMOL/L — SIGNIFICANT CHANGE UP (ref 135–146)
WBC # BLD: 10.78 K/UL — SIGNIFICANT CHANGE UP (ref 4.8–10.8)
WBC # FLD AUTO: 10.78 K/UL — SIGNIFICANT CHANGE UP (ref 4.8–10.8)

## 2025-05-16 PROCEDURE — 80048 BASIC METABOLIC PNL TOTAL CA: CPT

## 2025-05-16 PROCEDURE — 93458 L HRT ARTERY/VENTRICLE ANGIO: CPT

## 2025-05-16 PROCEDURE — C1894: CPT

## 2025-05-16 PROCEDURE — 36415 COLL VENOUS BLD VENIPUNCTURE: CPT

## 2025-05-16 PROCEDURE — C1769: CPT

## 2025-05-16 PROCEDURE — 85027 COMPLETE CBC AUTOMATED: CPT

## 2025-05-16 PROCEDURE — 93458 L HRT ARTERY/VENTRICLE ANGIO: CPT | Mod: 26

## 2025-05-16 PROCEDURE — C1887: CPT

## 2025-05-16 RX ORDER — ASPIRIN 325 MG
81 TABLET ORAL ONCE
Refills: 0 | Status: COMPLETED | OUTPATIENT
Start: 2025-05-16 | End: 2025-05-16

## 2025-05-16 RX ORDER — ASPIRIN 325 MG
0 TABLET ORAL
Refills: 0 | DISCHARGE

## 2025-05-16 RX ORDER — TAMSULOSIN HYDROCHLORIDE 0.4 MG/1
1 CAPSULE ORAL
Refills: 0 | DISCHARGE

## 2025-05-16 RX ORDER — AMLODIPINE BESYLATE 10 MG/1
2.5 TABLET ORAL ONCE
Refills: 0 | Status: COMPLETED | OUTPATIENT
Start: 2025-05-16 | End: 2025-05-16

## 2025-05-16 RX ORDER — ATORVASTATIN CALCIUM 80 MG/1
1 TABLET, FILM COATED ORAL
Refills: 0 | DISCHARGE

## 2025-05-16 RX ORDER — RANOLAZINE 1000 MG/1
500 TABLET, FILM COATED, EXTENDED RELEASE ORAL ONCE
Refills: 0 | Status: COMPLETED | OUTPATIENT
Start: 2025-05-16 | End: 2025-05-16

## 2025-05-16 RX ADMIN — RANOLAZINE 500 MILLIGRAM(S): 1000 TABLET, FILM COATED, EXTENDED RELEASE ORAL at 10:15

## 2025-05-16 RX ADMIN — Medication 250 MILLILITER(S): at 10:15

## 2025-05-16 RX ADMIN — Medication 75 MILLILITER(S): at 10:16

## 2025-05-16 RX ADMIN — Medication 81 MILLIGRAM(S): at 10:15

## 2025-05-16 RX ADMIN — Medication 100 MILLILITER(S): at 11:54

## 2025-05-16 RX ADMIN — AMLODIPINE BESYLATE 2.5 MILLIGRAM(S): 10 TABLET ORAL at 10:15

## 2025-05-16 NOTE — ASU DISCHARGE PLAN (ADULT/PEDIATRIC) - ASU DC SPECIAL INSTRUCTIONSFT
Activity:  - Do not drive or operate heavy machinery for 24 hours.  - Limit your physical or any strenuous activity for 2 weeks after angioplasty and 48 hours for angiogram. Support the groin site with your hand when you sneeze or cough. No heavy lifting ( objects more then 10 pounds).  - For wrist access, avoid using affected arm for 24 hours after removal of dressing and avoid heavy lifting for 7 days.  Hygiene:  - After 24 hours, you may shower and remove the dressing from the site. Do not tub bathe for one week. Do not rub or apply lotion, cream, powder to the affected site. Leave it open to air.   Diet:   - You may resume your diet. Low Sodium. Low Fat, Low Cholesterol.  If Diabetic - Carbohydrate Consistent Diet.      - Drink extra fluid unless otherwise advised.   Special Instructions:  - Bruising or black and blue at the puncture site is possible.  - If there is bleeding from the puncture site (groin or wrist) apply direct firm pressure on the site and call 911.  - Any sudden swelling, redness, fever, discharge or severe pain, call your physician or call the cath lab.   - If you notice any scab formation in the area avoid touching the site and allow it to heal.  - Numbness or "pins and needle" sensation in the affected arm, hand, leg or if the affected site become cool to touch or pale that persist for extended period of time call your physician immediately to be checked.  - If you developed chest pain, not relieved by your usual routine medication, fainting, lethargy, weakness, report to the nearest emergency room.   - Inform your Dentist or Surgeon if you are taking Aspirin or any antiplatelet medications. Report any bleeding in your urine or stool.   Medications:  - Soreness or tenderness at the site is possible it will diminish over time. You may take Tylenol every 4-6 hours as needed. Nothing stronger is needed.  - If you are diabetic and taking medication containing Metformin, do not take them for 48 hours after the procedure.     Any questions call Cardiac Cath Lab at [phone] or [phone], Monday - Friday from 7 - 9 pm.

## 2025-05-16 NOTE — ASU DISCHARGE PLAN (ADULT/PEDIATRIC) - FINANCIAL ASSISTANCE
[facility] provides services at a reduced cost to those who are determined to be eligible through [facility]’s financial assistance program. Information regarding [facility]’s financial assistance program can be found by going to [email or web address] or by calling [phone].

## 2025-05-16 NOTE — ASU DISCHARGE PLAN (ADULT/PEDIATRIC) - CARE PROVIDER_API CALL
Leonides Wing  Interventional Cardiology  [address] [place] [id number]  Phone: [phone]  Fax: [phone]  Follow Up Time: 2 weeks

## 2025-05-16 NOTE — ASU DISCHARGE PLAN (ADULT/PEDIATRIC) - NS MD DC FALL RISK RISK
For information on Fall & Injury Prevention, visit: [email or web address] OR  [email or web address] OR  https://www.cdc.gov/steadi/patient.html

## 2025-05-23 DIAGNOSIS — I25.118 ATHEROSCLEROTIC HEART DISEASE OF NATIVE CORONARY ARTERY WITH OTHER FORMS OF ANGINA PECTORIS: ICD-10-CM

## 2025-05-23 DIAGNOSIS — I10 ESSENTIAL (PRIMARY) HYPERTENSION: ICD-10-CM

## 2025-05-23 DIAGNOSIS — R94.39 ABNORMAL RESULT OF OTHER CARDIOVASCULAR FUNCTION STUDY: ICD-10-CM

## 2025-05-23 DIAGNOSIS — E78.5 HYPERLIPIDEMIA, UNSPECIFIED: ICD-10-CM
